# Patient Record
(demographics unavailable — no encounter records)

---

## 2025-04-24 NOTE — HISTORY OF PRESENT ILLNESS
[FreeTextEntry1] : cpx [de-identified] : cpx only on mvi and zyrtec donated blood oct and feb--no gi symps--has not had colon--mild fe def-- prediabetes hemmocult + but has occ hemm bleeding cornelio suspect

## 2025-04-24 NOTE — ASSESSMENT
[Vaccines Reviewed] : Immunizations reviewed today. Please see immunization details in the vaccine log within the immunization flowsheet.  [FreeTextEntry1] : labs 8 wks--to lab cologuard advised to schedule colon wt loss discussed including current pharm cornelio home screening age augustine vaccs discussed